# Patient Record
Sex: FEMALE | URBAN - METROPOLITAN AREA
[De-identification: names, ages, dates, MRNs, and addresses within clinical notes are randomized per-mention and may not be internally consistent; named-entity substitution may affect disease eponyms.]

---

## 2020-01-29 ENCOUNTER — APPOINTMENT (RX ONLY)
Dept: URBAN - METROPOLITAN AREA CLINIC 29 | Facility: CLINIC | Age: 55
Setting detail: DERMATOLOGY
End: 2020-01-29

## 2020-01-29 DIAGNOSIS — Z41.9 ENCOUNTER FOR PROCEDURE FOR PURPOSES OTHER THAN REMEDYING HEALTH STATE, UNSPECIFIED: ICD-10-CM

## 2020-01-29 PROCEDURE — ? COSMETIC CONSULTATION - MICRO-NEEDLING

## 2020-01-29 ASSESSMENT — LOCATION ZONE DERM: LOCATION ZONE: FACE

## 2020-01-29 ASSESSMENT — LOCATION SIMPLE DESCRIPTION DERM: LOCATION SIMPLE: LEFT CHEEK

## 2020-01-29 ASSESSMENT — LOCATION DETAILED DESCRIPTION DERM: LOCATION DETAILED: LEFT INFERIOR CENTRAL MALAR CHEEK

## 2020-02-25 ENCOUNTER — APPOINTMENT (RX ONLY)
Dept: URBAN - METROPOLITAN AREA CLINIC 29 | Facility: CLINIC | Age: 55
Setting detail: DERMATOLOGY
End: 2020-02-25

## 2020-02-25 DIAGNOSIS — Z41.9 ENCOUNTER FOR PROCEDURE FOR PURPOSES OTHER THAN REMEDYING HEALTH STATE, UNSPECIFIED: ICD-10-CM

## 2020-02-25 PROCEDURE — ? CHEMICAL PEEL

## 2020-02-25 ASSESSMENT — LOCATION ZONE DERM: LOCATION ZONE: FACE

## 2020-02-25 ASSESSMENT — LOCATION DETAILED DESCRIPTION DERM: LOCATION DETAILED: LEFT INFERIOR CENTRAL MALAR CHEEK

## 2020-02-25 ASSESSMENT — LOCATION SIMPLE DESCRIPTION DERM: LOCATION SIMPLE: LEFT CHEEK

## 2020-02-25 NOTE — PROCEDURE: CHEMICAL PEEL
Chemical Peel: 6% Pure Retinol Peel
Treatment Number: 1
Consent: Prior to the procedure, written consent was obtained and risks were reviewed, including but not limited to: redness, peeling, blistering, pigmentary change, scarring, infection, and pain.
Price (Use Numbers Only, No Special Characters Or $): 405.00
Post-Care Instructions: I reviewed with the patient in detail post-care instructions. Patient should avoid sun exposure and wear sun protection.
Detail Level: Zone
Comments: purchased 3 retinol peels. 1 of 3 done today. gave 10% off package purchase. patient purchased [id number] today\\ncleanse, acetone, retinol soln (1 layer) wash off in 8 hours.. pictures taken
Prep: The treated area was degreased with pre-peel cleanser, and vaseline was applied for protection of mucous membranes.
Post Peel Care: After the procedure, a post-peel cream was applied to the treated areas. Sun protection and post-care instructions were reviewed with the patient.

## 2020-02-25 NOTE — HPI: FACE (AGING FACE)
How Severe Is It?: moderate
Is This A New Presentation, Or A Follow-Up?: Aging Face
Additional History: none of the following: auto immune, diabetes, hepatitis, HIV, HSV

## 2020-09-14 ENCOUNTER — APPOINTMENT (RX ONLY)
Dept: URBAN - METROPOLITAN AREA CLINIC 29 | Facility: CLINIC | Age: 55
Setting detail: DERMATOLOGY
End: 2020-09-14

## 2020-09-14 DIAGNOSIS — Z41.9 ENCOUNTER FOR PROCEDURE FOR PURPOSES OTHER THAN REMEDYING HEALTH STATE, UNSPECIFIED: ICD-10-CM

## 2020-09-14 PROCEDURE — ? CHEMICAL PEEL

## 2020-09-14 ASSESSMENT — LOCATION ZONE DERM: LOCATION ZONE: FACE

## 2020-09-14 ASSESSMENT — LOCATION DETAILED DESCRIPTION DERM: LOCATION DETAILED: LEFT INFERIOR CENTRAL MALAR CHEEK

## 2020-09-14 ASSESSMENT — LOCATION SIMPLE DESCRIPTION DERM: LOCATION SIMPLE: LEFT CHEEK

## 2020-09-14 NOTE — PROCEDURE: CHEMICAL PEEL
Prep: The treated area was degreased with pre-peel cleanser, and vaseline was applied for protection of mucous membranes.
Treatment Number: 2
Post Peel Care: After the procedure, a post-peel cream was applied to the treated areas. Sun protection and post-care instructions were reviewed with the patient.
Consent: Prior to the procedure, written consent was obtained and risks were reviewed, including but not limited to: redness, peeling, blistering, pigmentary change, scarring, infection, and pain.
Chemical Peel: 6% Pure Retinol Peel
Detail Level: Zone
Comments: cleanse, acetone, .7% retinol, .3% arbutase, wash off in 8 hours. recommended new compounding cream to be purchased on next visit
Post-Care Instructions: I reviewed with the patient in detail post-care instructions. Patient should avoid sun exposure and wear sun protection.

## 2020-10-12 ENCOUNTER — APPOINTMENT (RX ONLY)
Dept: URBAN - METROPOLITAN AREA CLINIC 29 | Facility: CLINIC | Age: 55
Setting detail: DERMATOLOGY
End: 2020-10-12

## 2020-10-12 DIAGNOSIS — Z41.9 ENCOUNTER FOR PROCEDURE FOR PURPOSES OTHER THAN REMEDYING HEALTH STATE, UNSPECIFIED: ICD-10-CM

## 2020-10-12 PROCEDURE — ? OTHER

## 2020-10-12 PROCEDURE — ? CHEMICAL PEEL

## 2020-10-12 ASSESSMENT — LOCATION ZONE DERM: LOCATION ZONE: FACE

## 2020-10-12 ASSESSMENT — LOCATION DETAILED DESCRIPTION DERM: LOCATION DETAILED: LEFT INFERIOR CENTRAL MALAR CHEEK

## 2020-10-12 ASSESSMENT — LOCATION SIMPLE DESCRIPTION DERM: LOCATION SIMPLE: LEFT CHEEK

## 2020-10-12 NOTE — PROCEDURE: OTHER
Other (Free Text): patient purchased microneedling HA tx and retinol peel x1 to be done on her next tx in November. Combo tx
Detail Level: Zone
Note Text (......Xxx Chief Complaint.): This diagnosis correlates with the

## 2020-10-12 NOTE — PROCEDURE: CHEMICAL PEEL
Comments: cleanse, acetone, 1% retinol, wash off in 8 hours\\ncompunded new complexion correcting cream with 1% retinol solution... directions to start using tomorrow evening. patient to use her cerave during the day and new compounded cream at night. gave her sample of triple lipid in case she started feeling sensitive...
Detail Level: Zone
Post-Care Instructions: I reviewed with the patient in detail post-care instructions. Patient should avoid sun exposure and wear sun protection.
Post Peel Care: After the procedure, a post-peel cream was applied to the treated areas. Sun protection and post-care instructions were reviewed with the patient.
Chemical Peel: 6% Pure Retinol Peel
Consent: Prior to the procedure, written consent was obtained and risks were reviewed, including but not limited to: redness, peeling, blistering, pigmentary change, scarring, infection, and pain.
Treatment Number: 3
Prep: The treated area was degreased with pre-peel cleanser, and vaseline was applied for protection of mucous membranes.

## 2020-11-11 ENCOUNTER — APPOINTMENT (RX ONLY)
Dept: URBAN - METROPOLITAN AREA CLINIC 29 | Facility: CLINIC | Age: 55
Setting detail: DERMATOLOGY
End: 2020-11-11

## 2020-11-11 DIAGNOSIS — Z41.9 ENCOUNTER FOR PROCEDURE FOR PURPOSES OTHER THAN REMEDYING HEALTH STATE, UNSPECIFIED: ICD-10-CM

## 2020-11-11 PROCEDURE — ? OTHER

## 2020-11-11 PROCEDURE — ? MICRONEEDLING

## 2020-11-11 ASSESSMENT — LOCATION SIMPLE DESCRIPTION DERM: LOCATION SIMPLE: LEFT CHEEK

## 2020-11-11 ASSESSMENT — LOCATION DETAILED DESCRIPTION DERM: LOCATION DETAILED: LEFT INFERIOR CENTRAL MALAR CHEEK

## 2020-11-11 ASSESSMENT — LOCATION ZONE DERM: LOCATION ZONE: FACE

## 2020-11-11 NOTE — PROCEDURE: MICRONEEDLING
Location #4: nose, upper lip
Post-Care Instructions: After the procedure, take precautions agains sun exposure. Do not apply sunscreen for 12 hours after the procedure. Do not apply make-up for 12 hours after the procedure. Avoid alcohol based toners for 10-14 days. After 2-3 days patients can return to their regular skin regimen.
Depth In Mm (Location #2): 1.25
Depth In Mm (Location #1): 1
Infusions (Optional): hyaluronic acid
Depth In Mm (Location #5): 0.5
Location #3: forehead, temples
Topical Anesthesia?: BLT cream (benzocaine 10%, lidocaine 4%, tetracaine 2%)
Detail Level: Zone
Consent: Written consent obtained, risks reviewed including but not limited to pain, scarring, infection and incomplete improvement.  Patient understands the procedure is cosmetic in nature and will require out of pocket payment.
Depth In Mm (Location #3): 0.75
Location #2: neck, chest
Location #1: cheeks, chin

## 2020-11-11 NOTE — PROCEDURE: OTHER
Other (Free Text): 1ml retinol, .5ml arbutase, wash off in 8 hours. gave sample of even tone..
Note Text (......Xxx Chief Complaint.): This diagnosis correlates with the
Detail Level: Zone

## 2020-12-16 ENCOUNTER — APPOINTMENT (RX ONLY)
Dept: URBAN - METROPOLITAN AREA CLINIC 29 | Facility: CLINIC | Age: 55
Setting detail: DERMATOLOGY
End: 2020-12-16

## 2020-12-16 DIAGNOSIS — Z41.9 ENCOUNTER FOR PROCEDURE FOR PURPOSES OTHER THAN REMEDYING HEALTH STATE, UNSPECIFIED: ICD-10-CM

## 2020-12-16 PROCEDURE — ? DERMAPLANE

## 2020-12-16 PROCEDURE — ? OTHER

## 2020-12-16 ASSESSMENT — LOCATION SIMPLE DESCRIPTION DERM: LOCATION SIMPLE: LEFT CHEEK

## 2020-12-16 ASSESSMENT — LOCATION DETAILED DESCRIPTION DERM: LOCATION DETAILED: LEFT INFERIOR CENTRAL MALAR CHEEK

## 2020-12-16 ASSESSMENT — LOCATION ZONE DERM: LOCATION ZONE: FACE

## 2020-12-16 NOTE — PROCEDURE: OTHER
Note Text (......Xxx Chief Complaint.): This diagnosis correlates with the
Detail Level: Zone
Other (Free Text): pictures taken. cleanse, acetone, dermaplane, dermapeel, gentle cleanser, lightening peel (3-5 mins) phyto gel, hydrating b5 gel, ce ferulic, triple lipid and uv daily tinted\\n

## 2020-12-16 NOTE — PROCEDURE: DERMAPLANE
Price (Use Numbers Only, No Special Characters Or $): 99.00
Blade: Sharkey scalpel
Treatment Area Prep: acetone
Post-Care Instructions: I reviewed with the patient in detail post-care instructions.
Detail Level: Zone
Pre-Procedure Text: The patient was placed in a recumbant position on the procedure table.
Treatment Areas: face
Post-Procedure Instructions: Following the dermaplane procedure, Oxymist treatment was applied to the treatment areas. Moisturizer and SPF was applied.

## 2021-03-09 ENCOUNTER — APPOINTMENT (RX ONLY)
Dept: URBAN - METROPOLITAN AREA CLINIC 29 | Facility: CLINIC | Age: 56
Setting detail: DERMATOLOGY
End: 2021-03-09

## 2021-03-09 DIAGNOSIS — Z41.9 ENCOUNTER FOR PROCEDURE FOR PURPOSES OTHER THAN REMEDYING HEALTH STATE, UNSPECIFIED: ICD-10-CM

## 2021-03-09 PROCEDURE — ? OTHER

## 2021-03-09 PROCEDURE — ? MICRONEEDLING

## 2021-03-09 ASSESSMENT — LOCATION SIMPLE DESCRIPTION DERM: LOCATION SIMPLE: LEFT CHEEK

## 2021-03-09 ASSESSMENT — LOCATION DETAILED DESCRIPTION DERM: LOCATION DETAILED: LEFT INFERIOR CENTRAL MALAR CHEEK

## 2021-03-09 ASSESSMENT — LOCATION ZONE DERM: LOCATION ZONE: FACE

## 2021-03-09 NOTE — PROCEDURE: OTHER
Detail Level: Zone
Note Text (......Xxx Chief Complaint.): This diagnosis correlates with the
Other (Free Text): added retinol peel on top of microneedling tx\\n1% retinol, .5% arbutase to face, neck and chest. wash off in 6 hours\\npatient loved her results from last tx
Render Risk Assessment In Note?: no

## 2021-03-09 NOTE — PROCEDURE: MICRONEEDLING
Depth In Mm (Location #2): 0.75
Location #4: eyes
Treatment Number (Optional): 2
Depth In Mm (Location #1): 1.25
Location #3: nose, upper lip
Price (Use Numbers Only, No Special Characters Or $): 399.00
Post-Care Instructions: After the procedure, take precautions agains sun exposure. Do not apply sunscreen for 12 hours after the procedure. Do not apply make-up for 12 hours after the procedure. Avoid alcohol based toners for 10-14 days. After 2-3 days patients can return to their regular skin regimen.
Location #2: forehead, temples
Depth In Mm (Location #4): 0.25
Detail Level: Zone
Infusions (Optional): hyaluronic acid
Depth In Mm (Location #3): 0.5
Depth In Mm (Location #5): 1.75
Consent: Written consent obtained, risks reviewed including but not limited to pain, scarring, infection and incomplete improvement.  Patient understands the procedure is cosmetic in nature and will require out of pocket payment.
Topical Anesthesia?: BLT cream (benzocaine 10%, lidocaine 4%, tetracaine 2%)
Location #1: cheeks, chin, neck, chest

## 2021-04-05 ENCOUNTER — APPOINTMENT (RX ONLY)
Dept: URBAN - METROPOLITAN AREA CLINIC 29 | Facility: CLINIC | Age: 56
Setting detail: DERMATOLOGY
End: 2021-04-05

## 2021-04-05 DIAGNOSIS — Z41.9 ENCOUNTER FOR PROCEDURE FOR PURPOSES OTHER THAN REMEDYING HEALTH STATE, UNSPECIFIED: ICD-10-CM

## 2021-04-05 PROCEDURE — ? FACIAL

## 2021-04-05 ASSESSMENT — LOCATION DETAILED DESCRIPTION DERM: LOCATION DETAILED: LEFT INFERIOR CENTRAL MALAR CHEEK

## 2021-04-05 ASSESSMENT — LOCATION SIMPLE DESCRIPTION DERM: LOCATION SIMPLE: LEFT CHEEK

## 2021-04-05 ASSESSMENT — LOCATION ZONE DERM: LOCATION ZONE: FACE

## 2021-04-05 NOTE — PROCEDURE: FACIAL
Treatment Type Override: clinical facial
Detail Level: Zone
Comments (Sticky): cleanse, dermaplane, dermapeel, lightning peel, phyto gel, CE Ferulic, triple lipid and uv daily tinted
Extraction Method: sterile needle
Mask Type (Optional): gel based

## 2021-04-21 ENCOUNTER — APPOINTMENT (RX ONLY)
Dept: URBAN - METROPOLITAN AREA CLINIC 29 | Facility: CLINIC | Age: 56
Setting detail: DERMATOLOGY
End: 2021-04-21

## 2021-04-21 DIAGNOSIS — L82.1 OTHER SEBORRHEIC KERATOSIS: ICD-10-CM

## 2021-04-21 DIAGNOSIS — L73.8 OTHER SPECIFIED FOLLICULAR DISORDERS: ICD-10-CM

## 2021-04-21 DIAGNOSIS — D22 MELANOCYTIC NEVI: ICD-10-CM

## 2021-04-21 DIAGNOSIS — L82.0 INFLAMED SEBORRHEIC KERATOSIS: ICD-10-CM

## 2021-04-21 DIAGNOSIS — Z71.89 OTHER SPECIFIED COUNSELING: ICD-10-CM

## 2021-04-21 DIAGNOSIS — D18.0 HEMANGIOMA: ICD-10-CM

## 2021-04-21 DIAGNOSIS — L81.1 CHLOASMA: ICD-10-CM | Status: INADEQUATELY CONTROLLED

## 2021-04-21 DIAGNOSIS — L81.4 OTHER MELANIN HYPERPIGMENTATION: ICD-10-CM

## 2021-04-21 DIAGNOSIS — L91.8 OTHER HYPERTROPHIC DISORDERS OF THE SKIN: ICD-10-CM

## 2021-04-21 DIAGNOSIS — L81.0 POSTINFLAMMATORY HYPERPIGMENTATION: ICD-10-CM

## 2021-04-21 PROBLEM — D18.01 HEMANGIOMA OF SKIN AND SUBCUTANEOUS TISSUE: Status: ACTIVE | Noted: 2021-04-21

## 2021-04-21 PROBLEM — D48.5 NEOPLASM OF UNCERTAIN BEHAVIOR OF SKIN: Status: ACTIVE | Noted: 2021-04-21

## 2021-04-21 PROBLEM — D22.5 MELANOCYTIC NEVI OF TRUNK: Status: ACTIVE | Noted: 2021-04-21

## 2021-04-21 PROCEDURE — ? ADDITIONAL NOTES

## 2021-04-21 PROCEDURE — 99204 OFFICE O/P NEW MOD 45 MIN: CPT | Mod: 25

## 2021-04-21 PROCEDURE — ? COUNSELING

## 2021-04-21 PROCEDURE — ? BIOPSY BY SHAVE METHOD

## 2021-04-21 PROCEDURE — ? PRESCRIPTION MEDICATION MANAGEMENT

## 2021-04-21 PROCEDURE — ? PRESCRIPTION

## 2021-04-21 PROCEDURE — 11102 TANGNTL BX SKIN SINGLE LES: CPT

## 2021-04-21 RX ORDER — FLUOCINOLONE ACETONIDE, HYDROQUINONE, AND TRETINOIN .1; 40; .5 MG/G; MG/G; MG/G
CREAM TOPICAL QHS
Qty: 1 | Refills: 2 | Status: ERX | COMMUNITY
Start: 2021-04-21

## 2021-04-21 RX ADMIN — FLUOCINOLONE ACETONIDE, HYDROQUINONE, AND TRETINOIN 1: .1; 40; .5 CREAM TOPICAL at 00:00

## 2021-04-21 ASSESSMENT — LOCATION DETAILED DESCRIPTION DERM
LOCATION DETAILED: RIGHT VENTRAL DISTAL FOREARM
LOCATION DETAILED: RIGHT MID-UPPER BACK
LOCATION DETAILED: LEFT LATERAL BUCCAL CHEEK
LOCATION DETAILED: RIGHT LATERAL BREAST 10-11:00 REGION
LOCATION DETAILED: RIGHT SUPERIOR MEDIAL MIDBACK
LOCATION DETAILED: RIGHT FOREHEAD
LOCATION DETAILED: LEFT CENTRAL MALAR CHEEK
LOCATION DETAILED: LEFT SUPERIOR UPPER BACK
LOCATION DETAILED: INFERIOR THORACIC SPINE
LOCATION DETAILED: LEFT VENTRAL DISTAL FOREARM
LOCATION DETAILED: EPIGASTRIC SKIN
LOCATION DETAILED: LEFT LATERAL SUPERIOR CHEST
LOCATION DETAILED: LEFT INFERIOR UPPER BACK
LOCATION DETAILED: RIGHT SUPERIOR LATERAL NECK
LOCATION DETAILED: LEFT SUPERIOR MEDIAL UPPER BACK
LOCATION DETAILED: RIGHT PROXIMAL DORSAL FOREARM
LOCATION DETAILED: RIGHT INFERIOR CENTRAL MALAR CHEEK
LOCATION DETAILED: LEFT PROXIMAL DORSAL FOREARM

## 2021-04-21 ASSESSMENT — LOCATION ZONE DERM
LOCATION ZONE: ARM
LOCATION ZONE: TRUNK
LOCATION ZONE: NECK
LOCATION ZONE: FACE

## 2021-04-21 ASSESSMENT — LOCATION SIMPLE DESCRIPTION DERM
LOCATION SIMPLE: RIGHT FOREHEAD
LOCATION SIMPLE: ABDOMEN
LOCATION SIMPLE: LEFT UPPER BACK
LOCATION SIMPLE: CHEST
LOCATION SIMPLE: RIGHT BREAST
LOCATION SIMPLE: RIGHT LOWER BACK
LOCATION SIMPLE: RIGHT CHEEK
LOCATION SIMPLE: RIGHT FOREARM
LOCATION SIMPLE: UPPER BACK
LOCATION SIMPLE: LEFT CHEEK
LOCATION SIMPLE: NECK
LOCATION SIMPLE: RIGHT UPPER BACK
LOCATION SIMPLE: LEFT FOREARM

## 2021-04-21 NOTE — PROCEDURE: ADDITIONAL NOTES
Additional Notes: Discussed PIH and scarring \\n-no laser tx for the next 2 weeks\\n\\n-pt verbalized understanding
Detail Level: Detailed
Render Risk Assessment In Note?: no

## 2021-04-21 NOTE — PROCEDURE: COUNSELING
Detail Level: Detailed
Detail Level: Zone
Sunscreen Recommendations: otc broad spectrum sunscreen with minimum spf of 30 or higher

## 2021-04-21 NOTE — PROCEDURE: PRESCRIPTION MEDICATION MANAGEMENT
Render In Strict Bullet Format?: No
Detail Level: Zone
Plan: Informed pt that melasma is the tough to tx \\n\\nDiscussed ochronosis if Tri-janice is overused \\n-strict sunscreen protection \\n\\n Recommended pt use Skinceuticals discoloration defense if pt does not want to use hydroquinone products \\n\\nInformed pt that BBL. Can make melasma worse due to the heat \\n\\nPt verbalized understanding
Initiate Treatment: Tri-Hillary 0.01 %-4 %-0.05 % topical cream QHS\\nDays Supply: 30\\nSig: Apply pea size amount to discoloration on face qhs for 4 months on and two months off; strict sun protection

## 2021-04-21 NOTE — PROCEDURE: BIOPSY BY SHAVE METHOD
Detail Level: Detailed
Depth Of Biopsy: dermis
Was A Bandage Applied: Yes
Size Of Lesion In Cm: 0
Biopsy Type: H and E
Biopsy Method: Personna blade
Anesthesia Type: 1% lidocaine with epinephrine
Anesthesia Volume In Cc: 0.5
Hemostasis: Aluminum Chloride
Wound Care: Vaseline
Dressing: bandage
Destruction After The Procedure: No
Type Of Destruction Used: Curettage
Curettage Text: The wound bed was treated with curettage after the biopsy was performed.
Cryotherapy Text: The wound bed was treated with cryotherapy after the biopsy was performed.
Electrodesiccation Text: The wound bed was treated with electrodesiccation after the biopsy was performed.
Electrodesiccation And Curettage Text: The wound bed was treated with electrodesiccation and curettage after the biopsy was performed.
Silver Nitrate Text: The wound bed was treated with silver nitrate after the biopsy was performed.
Lab: [id number]
Consent: Written consent was obtained and risks were reviewed including but not limited to scarring, infection, bleeding, scabbing, incomplete removal, nerve damage and allergy to anesthesia.
Post-Care Instructions: I reviewed with the patient in detail post-care instructions. Patient is to keep the biopsy site dry overnight, wash daily with mild soap water and then apply Vaseline twice daily until healed.
Notification Instructions: Patient will be notified of biopsy results. However, patient instructed to call the office if not contacted within 2 weeks. Results will be faxed to PCP once they are available.
Billing Type: Third-Party Bill
Information: Selecting Yes will display possible errors in your note based on the variables you have selected. This validation is only offered as a suggestion for you. PLEASE NOTE THAT THE VALIDATION TEXT WILL BE REMOVED WHEN YOU FINALIZE YOUR NOTE. IF YOU WANT TO FAX A PRELIMINARY NOTE YOU WILL NEED TO TOGGLE THIS TO 'NO' IF YOU DO NOT WANT IT IN YOUR FAXED NOTE.

## 2021-04-21 NOTE — HPI: DISCOLORATION (MELASMA)
How Severe Are They?: moderate
Is This A New Presentation, Or A Follow-Up?: Discoloration
Include Thrombotic Event Screening In Note For Tranexamic Acid Therapy?: Yes

## 2021-05-19 ENCOUNTER — APPOINTMENT (RX ONLY)
Dept: URBAN - METROPOLITAN AREA CLINIC 29 | Facility: CLINIC | Age: 56
Setting detail: DERMATOLOGY
End: 2021-05-19

## 2021-05-19 DIAGNOSIS — Z41.9 ENCOUNTER FOR PROCEDURE FOR PURPOSES OTHER THAN REMEDYING HEALTH STATE, UNSPECIFIED: ICD-10-CM

## 2021-05-19 PROCEDURE — ? MICRONEEDLING

## 2021-05-19 PROCEDURE — ? OTHER

## 2021-05-19 ASSESSMENT — LOCATION ZONE DERM: LOCATION ZONE: FACE

## 2021-05-19 ASSESSMENT — LOCATION DETAILED DESCRIPTION DERM: LOCATION DETAILED: LEFT INFERIOR MEDIAL MALAR CHEEK

## 2021-05-19 ASSESSMENT — LOCATION SIMPLE DESCRIPTION DERM: LOCATION SIMPLE: LEFT CHEEK

## 2021-05-19 NOTE — PROCEDURE: OTHER
Detail Level: Zone
Other (Free Text): retinol peel applied over microneedling tx. wash off in 2 hours
Note Text (......Xxx Chief Complaint.): This diagnosis correlates with the
Render Risk Assessment In Note?: no

## 2021-05-19 NOTE — PROCEDURE: MICRONEEDLING
Location #3: nose, upper lip
Detail Level: Zone
Location #4: eyes
Depth In Mm (Location #5): 1.75
Topical Anesthesia?: BLT cream (benzocaine 10%, lidocaine 4%, tetracaine 2%)
Depth In Mm (Location #4): 0.25
Post-Care Instructions: After the procedure, take precautions agains sun exposure. Do not apply sunscreen for 12 hours after the procedure. Do not apply make-up for 12 hours after the procedure. Avoid alcohol based toners for 10-14 days. After 2-3 days patients can return to their regular skin regimen.
Location #2: forehead, temples
Price (Use Numbers Only, No Special Characters Or $): 399.00
Treatment Number (Optional): 3
Location #1: cheeks, chin, neck, chest
Infusions (Optional): hyaluronic acid
Depth In Mm (Location #3): 0.5
Depth In Mm (Location #2): 0.75
Depth In Mm (Location #1): 1.25
Consent: Written consent obtained, risks reviewed including but not limited to pain, scarring, infection and incomplete improvement.  Patient understands the procedure is cosmetic in nature and will require out of pocket payment.

## 2021-06-02 ENCOUNTER — APPOINTMENT (RX ONLY)
Dept: URBAN - METROPOLITAN AREA CLINIC 29 | Facility: CLINIC | Age: 56
Setting detail: DERMATOLOGY
End: 2021-06-02

## 2021-06-02 DIAGNOSIS — L98.8 OTHER SPECIFIED DISORDERS OF THE SKIN AND SUBCUTANEOUS TISSUE: ICD-10-CM

## 2021-06-02 PROCEDURE — ? BOTOX

## 2021-06-02 PROCEDURE — ? PHOTO-DOCUMENTATION

## 2021-06-02 ASSESSMENT — LOCATION DETAILED DESCRIPTION DERM
LOCATION DETAILED: RIGHT INFERIOR LATERAL FOREHEAD
LOCATION DETAILED: LEFT INFERIOR FOREHEAD
LOCATION DETAILED: LEFT SUPERIOR FOREHEAD
LOCATION DETAILED: LEFT INFERIOR MEDIAL FOREHEAD
LOCATION DETAILED: SUPERIOR MID FOREHEAD
LOCATION DETAILED: RIGHT LATERAL FOREHEAD
LOCATION DETAILED: INFERIOR MID FOREHEAD

## 2021-06-02 ASSESSMENT — LOCATION SIMPLE DESCRIPTION DERM
LOCATION SIMPLE: LEFT FOREHEAD
LOCATION SIMPLE: INFERIOR FOREHEAD
LOCATION SIMPLE: SUPERIOR FOREHEAD
LOCATION SIMPLE: RIGHT FOREHEAD

## 2021-06-02 ASSESSMENT — LOCATION ZONE DERM: LOCATION ZONE: FACE

## 2021-06-02 NOTE — PROCEDURE: BOTOX
R Brow Units: 0
Post-Care Instructions: Patient instructed to not lie down for 4 hours and limit physical activity for 24 hours. Patient instructed not to travel by airplane for 48 hours.
Show Additional Area 1: Yes
Show Right And Left Periorbital Units: No
Dilution (U/0.1 Cc): 5
Glabellar Complex Units: 20
Reconstitution Date (Optional): 5/27/2021
Expiration Date (Month Year): 08/2023
Detail Level: Simple
Lot #: [id number]
Price (Use Numbers Only, No Special Characters Or $): 390
Forehead Units: 10
Consent: Written consent obtained. Risks include but not limited to lid/brow ptosis, bruising, swelling, diplopia, temporary effect, incomplete chemical denervation.

## 2021-06-22 ENCOUNTER — APPOINTMENT (RX ONLY)
Dept: URBAN - METROPOLITAN AREA CLINIC 29 | Facility: CLINIC | Age: 56
Setting detail: DERMATOLOGY
End: 2021-06-22

## 2021-06-22 DIAGNOSIS — L98.8 OTHER SPECIFIED DISORDERS OF THE SKIN AND SUBCUTANEOUS TISSUE: ICD-10-CM

## 2021-06-22 PROCEDURE — ? BOTOX

## 2021-06-22 ASSESSMENT — LOCATION ZONE DERM: LOCATION ZONE: FACE

## 2021-06-22 ASSESSMENT — LOCATION DETAILED DESCRIPTION DERM
LOCATION DETAILED: RIGHT INFERIOR LATERAL FOREHEAD
LOCATION DETAILED: RIGHT LATERAL FOREHEAD

## 2021-06-22 ASSESSMENT — LOCATION SIMPLE DESCRIPTION DERM: LOCATION SIMPLE: RIGHT FOREHEAD

## 2021-06-22 NOTE — PROCEDURE: BOTOX
Left Periorbital Skin Units: 0
Show Levator Superior Units: Yes
Lot #: [id number]
Dilution (U/0.1 Cc): 5
Forehead Units: 2
Consent: Written consent obtained. Risks include but not limited to lid/brow ptosis, bruising, swelling, diplopia, temporary effect, incomplete chemical denervation.
Post-Care Instructions: Patient instructed to not lie down for 4 hours and limit physical activity for 24 hours. Patient instructed not to travel by airplane for 48 hours.
Show Right And Left Periorbital Units: No
Reconstitution Date (Optional): 6/8/21
Expiration Date (Month Year): 9/1/23
Detail Level: Detailed
Price (Use Numbers Only, No Special Characters Or $): 26

## 2021-09-15 ENCOUNTER — APPOINTMENT (RX ONLY)
Dept: URBAN - METROPOLITAN AREA CLINIC 29 | Facility: CLINIC | Age: 56
Setting detail: DERMATOLOGY
End: 2021-09-15

## 2021-09-15 DIAGNOSIS — Z41.9 ENCOUNTER FOR PROCEDURE FOR PURPOSES OTHER THAN REMEDYING HEALTH STATE, UNSPECIFIED: ICD-10-CM

## 2021-09-15 PROCEDURE — ? MICRONEEDLING

## 2021-09-15 PROCEDURE — ? OTHER

## 2021-09-15 ASSESSMENT — LOCATION SIMPLE DESCRIPTION DERM: LOCATION SIMPLE: LEFT CHEEK

## 2021-09-15 ASSESSMENT — LOCATION DETAILED DESCRIPTION DERM: LOCATION DETAILED: LEFT INFERIOR CENTRAL MALAR CHEEK

## 2021-09-15 ASSESSMENT — LOCATION ZONE DERM: LOCATION ZONE: FACE

## 2021-09-15 NOTE — PROCEDURE: MICRONEEDLING
Treatment Number (Optional): 0
Consent: Written consent obtained, risks reviewed including but not limited to pain, scarring, infection and incomplete improvement.  Patient understands the procedure is cosmetic in nature and will require out of pocket payment.
Location #4: eyes, upper lip
Depth In Mm (Location #2): 1.25
Post-Care Instructions: After the procedure, take precautions agains sun exposure. Do not apply sunscreen for 12 hours after the procedure. Do not apply make-up for 12 hours after the procedure. Avoid alcohol based toners for 10-14 days. After 2-3 days patients can return to their regular skin regimen.
Detail Level: Zone
Location #2: neck, chest
Depth In Mm (Location #3): 0.5
Topical Anesthesia?: BLT cream (benzocaine 10%, lidocaine 4%, tetracaine 2%)
Location #1: cheeks, chin, upper lip
Depth In Mm (Location #4): 0.25
Depth In Mm (Location #1): 1
Price (Use Numbers Only, No Special Characters Or $): 375.00
Depth In Mm (Location #5): 1.75
Infusions (Optional): hyaluronic acid
Location #3: forehead, temples,nose

## 2021-10-11 ENCOUNTER — APPOINTMENT (RX ONLY)
Dept: URBAN - METROPOLITAN AREA CLINIC 29 | Facility: CLINIC | Age: 56
Setting detail: DERMATOLOGY
End: 2021-10-11

## 2021-10-11 DIAGNOSIS — Z41.9 ENCOUNTER FOR PROCEDURE FOR PURPOSES OTHER THAN REMEDYING HEALTH STATE, UNSPECIFIED: ICD-10-CM

## 2021-10-11 PROCEDURE — ? FACIAL

## 2021-10-11 ASSESSMENT — LOCATION ZONE DERM: LOCATION ZONE: FACE

## 2021-10-11 ASSESSMENT — LOCATION SIMPLE DESCRIPTION DERM: LOCATION SIMPLE: LEFT CHEEK

## 2021-10-11 ASSESSMENT — LOCATION DETAILED DESCRIPTION DERM: LOCATION DETAILED: LEFT CENTRAL MALAR CHEEK

## 2021-10-11 NOTE — PROCEDURE: FACIAL
Comments (Sticky): cleanse, acetone, dermaplane, dermapeel, gentle cleanse, lighting peel, phyto mask and Vit C mask, CE ferulic, triple lipid and uv elements\\n\\n\\n\\n\\n\\n\\n\\n\\n\\n\\n
Mask Type (Optional): gel based
Extraction Method: sterile needle
Detail Level: Zone
Price (Use Numbers Only, No Special Characters Or $): 99.00

## 2021-12-15 ENCOUNTER — APPOINTMENT (RX ONLY)
Dept: URBAN - METROPOLITAN AREA CLINIC 29 | Facility: CLINIC | Age: 56
Setting detail: DERMATOLOGY
End: 2021-12-15

## 2021-12-15 DIAGNOSIS — L98.8 OTHER SPECIFIED DISORDERS OF THE SKIN AND SUBCUTANEOUS TISSUE: ICD-10-CM

## 2021-12-15 PROCEDURE — ? BOTOX

## 2021-12-15 PROCEDURE — ? PHOTO-DOCUMENTATION

## 2021-12-15 ASSESSMENT — LOCATION ZONE DERM: LOCATION ZONE: FACE

## 2021-12-15 ASSESSMENT — LOCATION SIMPLE DESCRIPTION DERM
LOCATION SIMPLE: GLABELLA
LOCATION SIMPLE: LEFT FOREHEAD
LOCATION SIMPLE: RIGHT FOREHEAD

## 2021-12-15 ASSESSMENT — LOCATION DETAILED DESCRIPTION DERM
LOCATION DETAILED: LEFT MEDIAL FOREHEAD
LOCATION DETAILED: GLABELLA
LOCATION DETAILED: RIGHT INFERIOR FOREHEAD
LOCATION DETAILED: RIGHT FOREHEAD
LOCATION DETAILED: LEFT INFERIOR FOREHEAD
LOCATION DETAILED: RIGHT INFERIOR MEDIAL FOREHEAD
LOCATION DETAILED: LEFT INFERIOR MEDIAL FOREHEAD
LOCATION DETAILED: LEFT FOREHEAD
LOCATION DETAILED: LEFT LATERAL FOREHEAD

## 2021-12-15 NOTE — PROCEDURE: BOTOX
Nasal Root Units: 0
Forehead Units: 10
Show Mentalis Units: No
Expiration Date (Month Year): 02/02/2024
Show Additional Area 2: Yes
Dilution (U/0.1 Cc): 5
Reconstitution Date (Optional): 12/13/2021
Price (Use Numbers Only, No Special Characters Or $): 390
Glabellar Complex Units: 20
Consent: Written consent obtained. Risks include but not limited to lid/brow ptosis, bruising, swelling, diplopia, temporary effect, incomplete chemical denervation.
Post-Care Instructions: Patient instructed to not lie down for 4 hours and limit physical activity for 24 hours. Patient instructed not to travel by airplane for 48 hours.
Detail Level: Detailed
Lot #: [id number]

## 2022-01-01 NOTE — PROCEDURE: OTHER
Continue hypoglycemia protocol.
Detail Level: Zone
Other (Free Text): retinol peel post microneedling tx. wash off in 2 hours, patient purchased complexion correction compounded
Note Text (......Xxx Chief Complaint.): This diagnosis correlates with the
Render Risk Assessment In Note?: no

## 2022-04-20 ENCOUNTER — APPOINTMENT (RX ONLY)
Dept: URBAN - METROPOLITAN AREA CLINIC 29 | Facility: CLINIC | Age: 57
Setting detail: DERMATOLOGY
End: 2022-04-20

## 2022-04-20 DIAGNOSIS — L98.8 OTHER SPECIFIED DISORDERS OF THE SKIN AND SUBCUTANEOUS TISSUE: ICD-10-CM

## 2022-04-20 PROCEDURE — ? BOTOX

## 2022-04-20 PROCEDURE — ? ADDITIONAL NOTES

## 2022-04-20 ASSESSMENT — LOCATION SIMPLE DESCRIPTION DERM
LOCATION SIMPLE: GLABELLA
LOCATION SIMPLE: RIGHT FOREHEAD
LOCATION SIMPLE: LEFT FOREHEAD
LOCATION SIMPLE: SUPERIOR FOREHEAD
LOCATION SIMPLE: LEFT EYEBROW
LOCATION SIMPLE: RIGHT EYEBROW

## 2022-04-20 ASSESSMENT — LOCATION DETAILED DESCRIPTION DERM
LOCATION DETAILED: RIGHT MEDIAL EYEBROW
LOCATION DETAILED: RIGHT INFERIOR MEDIAL FOREHEAD
LOCATION DETAILED: SUPERIOR MID FOREHEAD
LOCATION DETAILED: LEFT MEDIAL EYEBROW
LOCATION DETAILED: GLABELLA
LOCATION DETAILED: RIGHT LATERAL FOREHEAD
LOCATION DETAILED: LEFT INFERIOR MEDIAL FOREHEAD
LOCATION DETAILED: RIGHT SUPERIOR FOREHEAD
LOCATION DETAILED: LEFT LATERAL FOREHEAD
LOCATION DETAILED: LEFT SUPERIOR FOREHEAD

## 2022-04-20 ASSESSMENT — LOCATION ZONE DERM: LOCATION ZONE: FACE

## 2022-04-20 NOTE — PROCEDURE: BOTOX
Lcl Root Units: 0
Show Anterior Platysmal Band Units: Yes
Show Ucl Units: No
Glabellar Complex Units: 20
Consent: Written consent obtained. Risks include but not limited to lid/brow ptosis, bruising, swelling, diplopia, temporary effect, incomplete chemical denervation.
Detail Level: Detailed
Post-Care Instructions: Patient instructed to not lie down for 4 hours and limit physical activity for 24 hours. Patient instructed not to travel by airplane for 48 hours.
Lot #: [id number]
Expiration Date (Month Year): 5/31/24
Price (Use Numbers Only, No Special Characters Or $): 282
Forehead Units: 10
Dilution (U/0.1 Cc): 5

## 2022-06-28 ENCOUNTER — APPOINTMENT (RX ONLY)
Dept: URBAN - METROPOLITAN AREA CLINIC 29 | Facility: CLINIC | Age: 57
Setting detail: DERMATOLOGY
End: 2022-06-28

## 2022-06-28 DIAGNOSIS — D22 MELANOCYTIC NEVI: ICD-10-CM

## 2022-06-28 DIAGNOSIS — L73.8 OTHER SPECIFIED FOLLICULAR DISORDERS: ICD-10-CM

## 2022-06-28 DIAGNOSIS — L82.1 OTHER SEBORRHEIC KERATOSIS: ICD-10-CM

## 2022-06-28 DIAGNOSIS — L81.1 CHLOASMA: ICD-10-CM | Status: STABLE

## 2022-06-28 DIAGNOSIS — L64.8 OTHER ANDROGENIC ALOPECIA: ICD-10-CM | Status: INADEQUATELY CONTROLLED

## 2022-06-28 DIAGNOSIS — Z71.89 OTHER SPECIFIED COUNSELING: ICD-10-CM

## 2022-06-28 DIAGNOSIS — L91.8 OTHER HYPERTROPHIC DISORDERS OF THE SKIN: ICD-10-CM

## 2022-06-28 DIAGNOSIS — D18.0 HEMANGIOMA: ICD-10-CM

## 2022-06-28 PROBLEM — D18.01 HEMANGIOMA OF SKIN AND SUBCUTANEOUS TISSUE: Status: ACTIVE | Noted: 2022-06-28

## 2022-06-28 PROBLEM — D22.5 MELANOCYTIC NEVI OF TRUNK: Status: ACTIVE | Noted: 2022-06-28

## 2022-06-28 PROCEDURE — ? ADDITIONAL NOTES

## 2022-06-28 PROCEDURE — 99214 OFFICE O/P EST MOD 30 MIN: CPT

## 2022-06-28 PROCEDURE — ? PRESCRIPTION

## 2022-06-28 PROCEDURE — ? BENIGN DESTRUCTION COSMETIC

## 2022-06-28 PROCEDURE — ? SKIN TAG REMOVAL (COSMETIC)

## 2022-06-28 PROCEDURE — ? PRESCRIPTION MEDICATION MANAGEMENT

## 2022-06-28 PROCEDURE — ? COUNSELING

## 2022-06-28 RX ORDER — FLUOCINOLONE ACETONIDE, HYDROQUINONE, AND TRETINOIN .1; 40; .5 MG/G; MG/G; MG/G
CREAM TOPICAL QHS
Qty: 30 | Refills: 3 | Status: ERX | COMMUNITY
Start: 2022-06-28

## 2022-06-28 RX ADMIN — FLUOCINOLONE ACETONIDE, HYDROQUINONE, AND TRETINOIN: .1; 40; .5 CREAM TOPICAL at 00:00

## 2022-06-28 ASSESSMENT — LOCATION ZONE DERM
LOCATION ZONE: FACE
LOCATION ZONE: SCALP
LOCATION ZONE: ARM
LOCATION ZONE: TRUNK

## 2022-06-28 ASSESSMENT — LOCATION DETAILED DESCRIPTION DERM
LOCATION DETAILED: LEFT INFERIOR UPPER BACK
LOCATION DETAILED: INFERIOR THORACIC SPINE
LOCATION DETAILED: LEFT VENTRAL DISTAL FOREARM
LOCATION DETAILED: RIGHT FOREHEAD
LOCATION DETAILED: RIGHT INFERIOR CENTRAL MALAR CHEEK
LOCATION DETAILED: RIGHT MID-UPPER BACK
LOCATION DETAILED: LEFT PROXIMAL DORSAL FOREARM
LOCATION DETAILED: LEFT CENTRAL MALAR CHEEK
LOCATION DETAILED: RIGHT VENTRAL DISTAL FOREARM
LOCATION DETAILED: LEFT SUPERIOR MEDIAL UPPER BACK
LOCATION DETAILED: RIGHT MEDIAL FRONTAL SCALP
LOCATION DETAILED: RIGHT RIB CAGE
LOCATION DETAILED: LEFT LATERAL SUPERIOR CHEST
LOCATION DETAILED: RIGHT PROXIMAL DORSAL FOREARM
LOCATION DETAILED: LEFT SUPERIOR UPPER BACK
LOCATION DETAILED: EPIGASTRIC SKIN

## 2022-06-28 ASSESSMENT — LOCATION SIMPLE DESCRIPTION DERM
LOCATION SIMPLE: RIGHT SCALP
LOCATION SIMPLE: RIGHT CHEEK
LOCATION SIMPLE: CHEST
LOCATION SIMPLE: ABDOMEN
LOCATION SIMPLE: RIGHT UPPER BACK
LOCATION SIMPLE: LEFT UPPER BACK
LOCATION SIMPLE: RIGHT FOREARM
LOCATION SIMPLE: LEFT CHEEK
LOCATION SIMPLE: LEFT FOREARM
LOCATION SIMPLE: UPPER BACK
LOCATION SIMPLE: RIGHT FOREHEAD

## 2022-06-28 NOTE — PROCEDURE: BENIGN DESTRUCTION COSMETIC
Detail Level: Detailed
Post-Care Instructions: I reviewed with the patient in detail post-care instructions. Patient is to wear sunprotection, and avoid picking at any of the treated lesions. Pt may apply Vaseline to crusted or scabbing areas.
Price (Use Numbers Only, No Special Characters Or $): 50
Consent: The patient's consent was obtained including but not limited to risks of crusting, scabbing, blistering, scarring, darker or lighter pigmentary change, recurrence, incomplete removal and infection.
Anesthesia Type: 1% lidocaine with epinephrine
Anesthesia Volume In Cc: 0.5

## 2022-06-28 NOTE — PROCEDURE: SKIN TAG REMOVAL (COSMETIC)
Price (Use Numbers Only, No Special Characters Or $): 125
Consent: Written consent obtained and the risks of skin tag removal was reviewed with the patient including but not limited to bleeding, pigmentary change, infection, pain, and remote possibility of scarring.
Removed With: scissors
Detail Level: Detailed

## 2022-06-28 NOTE — PROCEDURE: PRESCRIPTION MEDICATION MANAGEMENT
Plan: Recommended Isdin sunscreen
Render In Strict Bullet Format?: No
Detail Level: Zone
Continue Regimen: Tri-Hillary 0.01 %-4 %-0.05 % topical cream QHS\\nQuantity: 30.0 g  Days Supply: 30\\nSig: Apply to discoloration on face nightly for up to 4 months after cleansing, then stop for 2 months. Repeat regimen as needed. Use strict sun protection.

## 2022-06-28 NOTE — PROCEDURE: ADDITIONAL NOTES
Additional Notes: Discussed PRP injections treatment
Detail Level: Detailed
Render Risk Assessment In Note?: no

## 2022-08-03 ENCOUNTER — APPOINTMENT (RX ONLY)
Dept: URBAN - METROPOLITAN AREA CLINIC 29 | Facility: CLINIC | Age: 57
Setting detail: DERMATOLOGY
End: 2022-08-03

## 2022-08-03 DIAGNOSIS — L98.8 OTHER SPECIFIED DISORDERS OF THE SKIN AND SUBCUTANEOUS TISSUE: ICD-10-CM

## 2022-08-03 PROCEDURE — ? BOTOX

## 2022-08-03 PROCEDURE — ? COSMETIC CONSULTATION: BOTOX

## 2022-08-03 ASSESSMENT — LOCATION DETAILED DESCRIPTION DERM
LOCATION DETAILED: RIGHT LATERAL EYEBROW
LOCATION DETAILED: LEFT LATERAL EYEBROW
LOCATION DETAILED: GLABELLA

## 2022-08-03 ASSESSMENT — LOCATION ZONE DERM: LOCATION ZONE: FACE

## 2022-08-03 ASSESSMENT — LOCATION SIMPLE DESCRIPTION DERM
LOCATION SIMPLE: LEFT EYEBROW
LOCATION SIMPLE: GLABELLA
LOCATION SIMPLE: RIGHT EYEBROW

## 2022-08-03 NOTE — PROCEDURE: BOTOX
Show Orbicularis Oculi Units: Yes
[facility] Units: 0
Show Right And Left Pupillary Line Units: No
Comments: Barcode #[id number]
Forehead Units: 10
Glabellar Complex Units: 20
Additional Area 1 Location: brow lift
Expiration Date (Month Year): 05/2024
Price (Use Numbers Only, No Special Characters Or $): 441
Additional Area 1 Units: 4
Detail Level: Detailed
Consent: Written consent obtained. Risks include but not limited to lid/brow ptosis, bruising, swelling, diplopia, temporary effect, incomplete chemical denervation.
Post-Care Instructions: Patient instructed to not lie down for 4 hours and limit physical activity for 24 hours. Patient instructed not to travel by airplane for 48 hours.
Lot #: [id number]
Dilution (U/0.1 Cc): 5
Reconstitution Date (Optional): 08/03/22

## 2022-11-23 ENCOUNTER — APPOINTMENT (RX ONLY)
Dept: URBAN - METROPOLITAN AREA CLINIC 29 | Facility: CLINIC | Age: 57
Setting detail: DERMATOLOGY
End: 2022-11-23

## 2022-11-23 DIAGNOSIS — L98.8 OTHER SPECIFIED DISORDERS OF THE SKIN AND SUBCUTANEOUS TISSUE: ICD-10-CM

## 2022-11-23 PROCEDURE — ? BOTOX

## 2022-11-23 PROCEDURE — ? ADDITIONAL NOTES

## 2022-11-23 ASSESSMENT — LOCATION DETAILED DESCRIPTION DERM
LOCATION DETAILED: LEFT MEDIAL EYEBROW
LOCATION DETAILED: RIGHT SUPERIOR FOREHEAD
LOCATION DETAILED: RIGHT MEDIAL EYEBROW
LOCATION DETAILED: RIGHT SUPERIOR MEDIAL FOREHEAD
LOCATION DETAILED: GLABELLA
LOCATION DETAILED: LEFT SUPERIOR FOREHEAD
LOCATION DETAILED: LEFT SUPERIOR LATERAL FOREHEAD
LOCATION DETAILED: RIGHT LATERAL FOREHEAD

## 2022-11-23 ASSESSMENT — LOCATION SIMPLE DESCRIPTION DERM
LOCATION SIMPLE: GLABELLA
LOCATION SIMPLE: LEFT EYEBROW
LOCATION SIMPLE: RIGHT EYEBROW
LOCATION SIMPLE: RIGHT FOREHEAD
LOCATION SIMPLE: LEFT FOREHEAD

## 2022-11-23 ASSESSMENT — LOCATION ZONE DERM: LOCATION ZONE: FACE

## 2022-11-23 NOTE — PROCEDURE: BOTOX
Show Additional Area 2: Yes
Depressor Anguli Oris Units: 0
Lot #: [id number]
Show Lcl Units: No
Forehead Units: 10
Dilution (U/0.1 Cc): 5
Detail Level: Detailed
Expiration Date (Month Year): 8/31/24
Price (Use Numbers Only, No Special Characters Or $): 390
Glabellar Complex Units: 20
Consent: Written consent obtained. Risks include but not limited to lid/brow ptosis, bruising, swelling, diplopia, temporary effect, incomplete chemical denervation.
Post-Care Instructions: Patient instructed to not lie down for 4 hours and limit physical activity for 24 hours. Patient instructed not to travel by airplane for 48 hours.

## 2023-03-01 ENCOUNTER — APPOINTMENT (RX ONLY)
Dept: URBAN - METROPOLITAN AREA CLINIC 29 | Facility: CLINIC | Age: 58
Setting detail: DERMATOLOGY
End: 2023-03-01

## 2023-03-01 DIAGNOSIS — L98.8 OTHER SPECIFIED DISORDERS OF THE SKIN AND SUBCUTANEOUS TISSUE: ICD-10-CM

## 2023-03-01 PROCEDURE — ? BOTOX (U OR CC)

## 2023-03-01 ASSESSMENT — LOCATION DETAILED DESCRIPTION DERM
LOCATION DETAILED: GLABELLA
LOCATION DETAILED: RIGHT SUPERIOR FOREHEAD
LOCATION DETAILED: LEFT SUPERIOR LATERAL FOREHEAD
LOCATION DETAILED: LEFT SUPERIOR FOREHEAD
LOCATION DETAILED: RIGHT INFERIOR MEDIAL FOREHEAD
LOCATION DETAILED: LEFT MEDIAL EYEBROW
LOCATION DETAILED: LEFT SUPERIOR MEDIAL FOREHEAD
LOCATION DETAILED: LEFT INFERIOR MEDIAL FOREHEAD

## 2023-03-01 ASSESSMENT — LOCATION SIMPLE DESCRIPTION DERM
LOCATION SIMPLE: LEFT EYEBROW
LOCATION SIMPLE: RIGHT FOREHEAD
LOCATION SIMPLE: LEFT FOREHEAD
LOCATION SIMPLE: GLABELLA

## 2023-03-01 ASSESSMENT — LOCATION ZONE DERM: LOCATION ZONE: FACE

## 2023-03-01 NOTE — PROCEDURE: BOTOX (U OR CC)
Post-Care Instructions: Patient instructed to not lie down for 4 hours and limit physical activity for 24 hours.
Periorbital Skin Units/Cc: 0
Reconstitution Date (Optional): 03/01/2023; barcode [id number]
Price (Use Numbers Only, No Special Characters Or $): 182
Lot #: [id number]
Including Pricing Information In The Note: No
Glabellar Complex Units: 3
Expiration Date (Month Year): 04/30/2025
Consent: Written consent obtained. Risks include but not limited to lid/brow ptosis, bruising, swelling, diplopia, temporary effect, incomplete chemical denervation.
Document As Units Or Cc?: units
Forehead Units/Cc: 10
Dilution (U/0.1 Cc): 5
Detail Level: Detailed
Quantity Per Injection Site (Units Or Cc): 2 U
Quantity Per Injection Site (Units Or Cc): 3 U
Expiration Date (Month Year): 02/28/2025
Reconstitution Date (Optional): 2/24/2023; barcode [id number]
Price (Use Numbers Only, No Special Characters Or $): 873
Lot #: [id number]
Glabellar Complex Units: 17
Quantity Per Injection Site (Units Or Cc): 5 U

## 2023-06-14 ENCOUNTER — APPOINTMENT (RX ONLY)
Dept: URBAN - METROPOLITAN AREA CLINIC 29 | Facility: CLINIC | Age: 58
Setting detail: DERMATOLOGY
End: 2023-06-14

## 2023-06-14 DIAGNOSIS — L98.8 OTHER SPECIFIED DISORDERS OF THE SKIN AND SUBCUTANEOUS TISSUE: ICD-10-CM

## 2023-06-14 PROCEDURE — ? BOTOX (U OR CC)

## 2023-06-14 PROCEDURE — ? MEDICAL CONSULTATION: DYNAMIC RHYTIDES

## 2023-06-14 ASSESSMENT — LOCATION DETAILED DESCRIPTION DERM
LOCATION DETAILED: INFERIOR MID FOREHEAD
LOCATION DETAILED: LEFT SUPERIOR MEDIAL FOREHEAD
LOCATION DETAILED: RIGHT INFERIOR MEDIAL FOREHEAD
LOCATION DETAILED: RIGHT SUPERIOR FOREHEAD
LOCATION DETAILED: LEFT SUPERIOR FOREHEAD
LOCATION DETAILED: LEFT SUPERIOR LATERAL FOREHEAD
LOCATION DETAILED: LEFT INFERIOR MEDIAL FOREHEAD
LOCATION DETAILED: GLABELLA

## 2023-06-14 ASSESSMENT — LOCATION SIMPLE DESCRIPTION DERM
LOCATION SIMPLE: INFERIOR FOREHEAD
LOCATION SIMPLE: LEFT FOREHEAD
LOCATION SIMPLE: RIGHT FOREHEAD
LOCATION SIMPLE: GLABELLA

## 2023-06-14 ASSESSMENT — LOCATION ZONE DERM: LOCATION ZONE: FACE

## 2023-06-14 NOTE — PROCEDURE: BOTOX (U OR CC)
Post-Care Instructions: Patient instructed to not lie down for 4 hours and limit physical activity for 24 hours.
Periorbital Skin Units/Cc: 0
Reconstitution Date (Optional): 6/9/2023; Barcode [id number]
Price (Use Numbers Only, No Special Characters Or $): 420
Lot #: [id number]
Including Pricing Information In The Note: Yes
Glabellar Complex Units: 20
Expiration Date (Month Year): 05/31/2025
Consent: Written consent obtained. Risks include but not limited to lid/brow ptosis, bruising, swelling, diplopia, temporary effect, incomplete chemical denervation.
Document As Units Or Cc?: units
Forehead Units/Cc: 10
Dilution (U/0.1 Cc): 5
Detail Level: Detailed
Quantity Per Injection Site (Units Or Cc): 2 U
Quantity Per Injection Site (Units Or Cc): 5 U

## 2023-09-13 ENCOUNTER — APPOINTMENT (RX ONLY)
Dept: URBAN - METROPOLITAN AREA CLINIC 29 | Facility: CLINIC | Age: 58
Setting detail: DERMATOLOGY
End: 2023-09-13

## 2023-09-13 DIAGNOSIS — L98.8 OTHER SPECIFIED DISORDERS OF THE SKIN AND SUBCUTANEOUS TISSUE: ICD-10-CM

## 2023-09-13 PROCEDURE — ? BOTOX

## 2023-09-13 ASSESSMENT — LOCATION SIMPLE DESCRIPTION DERM
LOCATION SIMPLE: LEFT FOREHEAD
LOCATION SIMPLE: RIGHT FOREHEAD
LOCATION SIMPLE: RIGHT EYEBROW
LOCATION SIMPLE: RIGHT SCALP
LOCATION SIMPLE: GLABELLA
LOCATION SIMPLE: LEFT SCALP
LOCATION SIMPLE: LEFT EYEBROW
LOCATION SIMPLE: FRONTAL SCALP

## 2023-09-13 ASSESSMENT — LOCATION DETAILED DESCRIPTION DERM
LOCATION DETAILED: RIGHT MEDIAL EYEBROW
LOCATION DETAILED: LEFT CENTRAL FRONTAL SCALP
LOCATION DETAILED: LEFT SUPERIOR LATERAL FOREHEAD
LOCATION DETAILED: LEFT SUPERIOR FOREHEAD
LOCATION DETAILED: LEFT INFERIOR MEDIAL FOREHEAD
LOCATION DETAILED: RIGHT MEDIAL FRONTAL SCALP
LOCATION DETAILED: GLABELLA
LOCATION DETAILED: RIGHT CENTRAL FRONTAL SCALP
LOCATION DETAILED: MEDIAL FRONTAL SCALP
LOCATION DETAILED: LEFT MEDIAL FRONTAL SCALP
LOCATION DETAILED: LEFT MEDIAL EYEBROW
LOCATION DETAILED: RIGHT SUPERIOR FOREHEAD
LOCATION DETAILED: RIGHT INFERIOR MEDIAL FOREHEAD

## 2023-09-13 ASSESSMENT — LOCATION ZONE DERM
LOCATION ZONE: FACE
LOCATION ZONE: SCALP

## 2023-09-13 NOTE — PROCEDURE: BOTOX
Mentalis Units: 0
Show Ucl Units: No
Consent: Written consent obtained. Risks include but not limited to lid/brow ptosis, bruising, swelling, diplopia, temporary effect, incomplete chemical denervation.
Detail Level: Detailed
Show Additional Area 6: Yes
Post-Care Instructions: Patient instructed to not lie down for 4 hours and limit physical activity for 24 hours. Patient instructed not to travel by airplane for 48 hours.
Forehead Units: 10
Lot #: [id number]
Dilution (U/0.1 Cc): 5
Glabellar Complex Units: 20
Patient Specific Comments (Will Not Stick From Patient To Patient): Barcode: [id number]
Expiration Date (Month Year): 4/30/25
Price (Use Numbers Only, No Special Characters Or $): 420
Incrementing Botox Units: By 0.5 Units

## 2023-10-02 ENCOUNTER — APPOINTMENT (RX ONLY)
Dept: URBAN - METROPOLITAN AREA CLINIC 29 | Facility: CLINIC | Age: 58
Setting detail: DERMATOLOGY
End: 2023-10-02

## 2023-10-02 DIAGNOSIS — L81.4 OTHER MELANIN HYPERPIGMENTATION: ICD-10-CM

## 2023-10-02 DIAGNOSIS — D18.0 HEMANGIOMA: ICD-10-CM

## 2023-10-02 DIAGNOSIS — D22 MELANOCYTIC NEVI: ICD-10-CM

## 2023-10-02 DIAGNOSIS — L90.5 SCAR CONDITIONS AND FIBROSIS OF SKIN: ICD-10-CM

## 2023-10-02 DIAGNOSIS — L82.1 OTHER SEBORRHEIC KERATOSIS: ICD-10-CM

## 2023-10-02 DIAGNOSIS — L81.1 CHLOASMA: ICD-10-CM | Status: STABLE

## 2023-10-02 DIAGNOSIS — Z71.89 OTHER SPECIFIED COUNSELING: ICD-10-CM

## 2023-10-02 DIAGNOSIS — F42.4 EXCORIATION (SKIN-PICKING) DISORDER: ICD-10-CM

## 2023-10-02 PROBLEM — D18.01 HEMANGIOMA OF SKIN AND SUBCUTANEOUS TISSUE: Status: ACTIVE | Noted: 2023-10-02

## 2023-10-02 PROBLEM — D22.5 MELANOCYTIC NEVI OF TRUNK: Status: ACTIVE | Noted: 2023-10-02

## 2023-10-02 PROCEDURE — ? PRESCRIPTION

## 2023-10-02 PROCEDURE — 99213 OFFICE O/P EST LOW 20 MIN: CPT

## 2023-10-02 PROCEDURE — ? SUNSCREEN RECOMMENDATIONS

## 2023-10-02 PROCEDURE — ? PRESCRIPTION MEDICATION MANAGEMENT

## 2023-10-02 PROCEDURE — ? ADDITIONAL NOTES

## 2023-10-02 PROCEDURE — ? COUNSELING

## 2023-10-02 RX ORDER — FLUOCINOLONE ACETONIDE, HYDROQUINONE, AND TRETINOIN .1; 40; .5 MG/G; MG/G; MG/G
1 CREAM TOPICAL QHS
Qty: 30 | Refills: 3 | Status: ERX | COMMUNITY
Start: 2023-10-02

## 2023-10-02 RX ADMIN — FLUOCINOLONE ACETONIDE, HYDROQUINONE, AND TRETINOIN 1: .1; 40; .5 CREAM TOPICAL at 00:00

## 2023-10-02 ASSESSMENT — LOCATION DETAILED DESCRIPTION DERM
LOCATION DETAILED: SUPERIOR MID FOREHEAD
LOCATION DETAILED: LEFT PROXIMAL CALF
LOCATION DETAILED: RIGHT MEDIAL TRAPEZIAL NECK
LOCATION DETAILED: RIGHT DISTAL POSTERIOR THIGH
LOCATION DETAILED: RIGHT ANTERIOR DISTAL THIGH
LOCATION DETAILED: RIGHT VENTRAL DISTAL FOREARM
LOCATION DETAILED: LEFT INFERIOR UPPER BACK
LOCATION DETAILED: LEFT POSTERIOR SHOULDER
LOCATION DETAILED: LEFT DISTAL DORSAL FOREARM
LOCATION DETAILED: LEFT ANTERIOR DISTAL THIGH
LOCATION DETAILED: RIGHT DISTAL CALF
LOCATION DETAILED: RIGHT DISTAL DORSAL FOREARM
LOCATION DETAILED: RIGHT INFERIOR CENTRAL MALAR CHEEK
LOCATION DETAILED: RIGHT POSTERIOR SHOULDER
LOCATION DETAILED: LEFT DISTAL POSTERIOR THIGH
LOCATION DETAILED: LEFT ANTERIOR SHOULDER
LOCATION DETAILED: LEFT MEDIAL BREAST 11-12:00 REGION
LOCATION DETAILED: LEFT PROXIMAL POSTERIOR UPPER ARM
LOCATION DETAILED: LEFT CENTRAL MALAR CHEEK
LOCATION DETAILED: INFERIOR THORACIC SPINE
LOCATION DETAILED: RIGHT INFERIOR LATERAL MALAR CHEEK
LOCATION DETAILED: LEFT SUPERIOR UPPER BACK
LOCATION DETAILED: LEFT SUPERIOR CENTRAL BUCCAL CHEEK
LOCATION DETAILED: RIGHT ANTERIOR LATERAL PROXIMAL THIGH
LOCATION DETAILED: RIGHT PROXIMAL DORSAL FOREARM
LOCATION DETAILED: RIGHT ANTERIOR DISTAL UPPER ARM
LOCATION DETAILED: RIGHT ANTERIOR SHOULDER
LOCATION DETAILED: RIGHT RIB CAGE
LOCATION DETAILED: LEFT DISTAL PRETIBIAL REGION
LOCATION DETAILED: LEFT SUPERIOR MEDIAL UPPER BACK
LOCATION DETAILED: EPIGASTRIC SKIN
LOCATION DETAILED: RIGHT PROXIMAL PRETIBIAL REGION
LOCATION DETAILED: LEFT PROXIMAL DORSAL FOREARM
LOCATION DETAILED: LEFT VENTRAL DISTAL FOREARM

## 2023-10-02 ASSESSMENT — LOCATION SIMPLE DESCRIPTION DERM
LOCATION SIMPLE: LEFT CALF
LOCATION SIMPLE: ABDOMEN
LOCATION SIMPLE: RIGHT UPPER ARM
LOCATION SIMPLE: LEFT CHEEK
LOCATION SIMPLE: LEFT PRETIBIAL REGION
LOCATION SIMPLE: RIGHT SHOULDER
LOCATION SIMPLE: RIGHT CALF
LOCATION SIMPLE: UPPER BACK
LOCATION SIMPLE: LEFT BREAST
LOCATION SIMPLE: RIGHT POSTERIOR THIGH
LOCATION SIMPLE: RIGHT FOREARM
LOCATION SIMPLE: POSTERIOR NECK
LOCATION SIMPLE: RIGHT THIGH
LOCATION SIMPLE: LEFT FOREARM
LOCATION SIMPLE: RIGHT PRETIBIAL REGION
LOCATION SIMPLE: LEFT POSTERIOR UPPER ARM
LOCATION SIMPLE: LEFT SHOULDER
LOCATION SIMPLE: RIGHT CHEEK
LOCATION SIMPLE: LEFT POSTERIOR THIGH
LOCATION SIMPLE: LEFT UPPER BACK
LOCATION SIMPLE: LEFT THIGH
LOCATION SIMPLE: SUPERIOR FOREHEAD

## 2023-10-02 ASSESSMENT — LOCATION ZONE DERM
LOCATION ZONE: NECK
LOCATION ZONE: ARM
LOCATION ZONE: TRUNK
LOCATION ZONE: LEG
LOCATION ZONE: FACE

## 2023-10-02 NOTE — PROCEDURE: COUNSELING
Detail Level: Detailed
Detail Level: Zone
Detail Level: Generalized
Sunscreen Recommendations: otc broad spectrum sunscreen with minimum spf of 30 or higher

## 2023-10-02 NOTE — PROCEDURE: ADDITIONAL NOTES
Additional Notes: Advised patient to RTC if lesion recurs\\nRecommend applying Aquaphor BID until healed. \\n\\nRecommended patient to refrain from picking at lesion.
Render Risk Assessment In Note?: no
Detail Level: Detailed
Additional Notes: Recommended follow up in 6 months when scar has had time to heal complete \\npost dog bite\\n\\nDiscussed saline injections for atrophy\\n-may help improve atrophy but no guarantees can be given\\n\\nPatient verbalized understanding of treatment plan and will schedule accordingly when ready.\\n\\ntx is cosmetic

## 2023-10-02 NOTE — PROCEDURE: PRESCRIPTION MEDICATION MANAGEMENT
Plan: Patient wanting to hold off to start in cooler months. Due to being outside for expended amount of time. \\nAdvised patient ok to use Tri-Hillary on arms as directed. \\n\\nRecommended patient to schedule with MASSIMO Rios for the following cosmetic procedures under her supervising physician, Madie Marie M.D. \\n-Micro needling  \\n-Chemical peels
Render In Strict Bullet Format?: No
Detail Level: Zone
Continue Regimen: Tri-Hillary 0.01 %-4 %-0.05 % topical cream QHS\\nQuantity: 30.0 g  Days Supply: 30\\nSig: Apply to discoloration on face nightly for up to 4 months after cleansing, then stop for 2 month. Repeat regimen as needed. Use strict sun protection.

## 2023-10-11 ENCOUNTER — APPOINTMENT (RX ONLY)
Dept: URBAN - METROPOLITAN AREA CLINIC 29 | Facility: CLINIC | Age: 58
Setting detail: DERMATOLOGY
End: 2023-10-11

## 2023-10-11 DIAGNOSIS — Z41.9 ENCOUNTER FOR PROCEDURE FOR PURPOSES OTHER THAN REMEDYING HEALTH STATE, UNSPECIFIED: ICD-10-CM

## 2023-10-11 PROCEDURE — ? CHEMICAL PEEL

## 2023-10-11 PROCEDURE — ? MICRONEEDLING

## 2023-10-11 PROCEDURE — ? COSMETIC CONSULTATION - MICRO-NEEDLING

## 2023-10-11 PROCEDURE — ? COSMETIC CONSULTATION: CHEMICAL PEELS

## 2023-10-11 ASSESSMENT — LOCATION DETAILED DESCRIPTION DERM
LOCATION DETAILED: RIGHT SUPERIOR MEDIAL BUCCAL CHEEK
LOCATION DETAILED: LEFT INFERIOR CENTRAL MALAR CHEEK
LOCATION DETAILED: RIGHT CENTRAL BUCCAL CHEEK
LOCATION DETAILED: LEFT SUPERIOR CENTRAL BUCCAL CHEEK

## 2023-10-11 ASSESSMENT — LOCATION SIMPLE DESCRIPTION DERM
LOCATION SIMPLE: LEFT CHEEK
LOCATION SIMPLE: RIGHT CHEEK

## 2023-10-11 ASSESSMENT — LOCATION ZONE DERM: LOCATION ZONE: FACE

## 2023-10-11 NOTE — PROCEDURE: MICRONEEDLING
Price (Use Numbers Only, No Special Characters Or $): 394
Depth In Mm (Location #4): 0.25
Location #3: eyes
Depth In Mm (Location #7): 0.1
Post-Care Instructions: After the procedure, take precautions agains sun exposure. Do not apply sunscreen for 12 hours after the procedure. Do not apply make-up for 12 hours after the procedure. Avoid alcohol based toners for 10-14 days. After 2-3 days patients can return to their regular skin regimen.
Treatment Number (Optional): 1
Consent: Written consent obtained, risks reviewed including but not limited to pain, scarring, infection and incomplete improvement.  Patient understands the procedure is cosmetic in nature and will require out of pocket payment.
Infusions (Optional): hyaluronic acid
Location #1: cheeks, chin, neck and chest
Topical Anesthesia?: BLT cream (benzocaine 10%, lidocaine 4%, tetracaine 2%)
Location #2: forehead, nose, upper lip
Detail Level: Zone
Depth In Mm (Location #2): 0.75

## 2023-10-11 NOTE — PROCEDURE: CHEMICAL PEEL
Price (Use Numbers Only, No Special Characters Or $): 79
Prep: The treated area was degreased with pre-peel cleanser, and vaseline was applied for protection of mucous membranes.
Treatment Number: 1
Detail Level: Zone
Number Of Layers: 3
Chemical Peel: Booster - Retinoic Acid 0.03%
Post-Care Instructions: I reviewed with the patient in detail post-care instructions. Patient should avoid sun exposure and wear sun protection.
Comments: applied RA Vit A peptide peel post microneedling. wash off in 2-3 hours, follow microneedling post procedure protocol. written instructions given. recommended SB peel pads. patient purchased. use tomorrow evening.
Post Peel Care: After the procedure, a post-peel cream was applied to the treated areas. Sun protection and post-care instructions were reviewed with the patient.
Consent: Prior to the procedure, written consent was obtained and risks were reviewed, including but not limited to: redness, peeling, blistering, pigmentary change, scarring, infection, and pain.

## 2024-02-29 ENCOUNTER — APPOINTMENT (RX ONLY)
Dept: URBAN - METROPOLITAN AREA CLINIC 29 | Facility: CLINIC | Age: 59
Setting detail: DERMATOLOGY
End: 2024-02-29

## 2024-02-29 DIAGNOSIS — Z41.9 ENCOUNTER FOR PROCEDURE FOR PURPOSES OTHER THAN REMEDYING HEALTH STATE, UNSPECIFIED: ICD-10-CM

## 2024-02-29 PROCEDURE — ? MICRONEEDLING

## 2024-02-29 PROCEDURE — ? OTHER

## 2024-02-29 ASSESSMENT — LOCATION SIMPLE DESCRIPTION DERM
LOCATION SIMPLE: RIGHT CHEEK
LOCATION SIMPLE: LEFT CHEEK

## 2024-02-29 ASSESSMENT — LOCATION DETAILED DESCRIPTION DERM
LOCATION DETAILED: RIGHT SUPERIOR MEDIAL BUCCAL CHEEK
LOCATION DETAILED: LEFT INFERIOR CENTRAL MALAR CHEEK

## 2024-02-29 ASSESSMENT — LOCATION ZONE DERM: LOCATION ZONE: FACE

## 2024-02-29 NOTE — PROCEDURE: OTHER
Other (Free Text): applied PCA retinol 6% post microneedling
Note Text (......Xxx Chief Complaint.): This diagnosis correlates with the
Detail Level: Zone
Render Risk Assessment In Note?: no

## 2024-02-29 NOTE — PROCEDURE: MICRONEEDLING
Depth In Mm (Location #2): 1
Location #3: forehead, nose, upper lip
Treatment Number (Optional): 2
Depth In Mm (Location #4): 0.25
Detail Level: Zone
Depth In Mm (Location #6): 0.1
Consent: Written consent obtained, risks reviewed including but not limited to pain, scarring, infection and incomplete improvement.  Patient understands the procedure is cosmetic in nature and will require out of pocket payment.
Depth In Mm (Location #1): 1.25
Post-Care Instructions: After the procedure, take precautions agains sun exposure. Do not apply sunscreen for 12 hours after the procedure. Do not apply make-up for 12 hours after the procedure. Avoid alcohol based toners for 10-14 days. After 2-3 days patients can return to their regular skin regimen.
How Many Cc Of Bacteriostatic 0.9% Saline Were Added?: 0
Infusions (Optional): hyaluronic acid
Location #2: chin
Depth In Mm (Location #3): 0.5
Location #4: eyes
Location #1: cheeks, neck, chest
Topical Anesthesia?: BLT cream (benzocaine 10%, lidocaine 4%, tetracaine 2%)

## 2024-09-18 ENCOUNTER — APPOINTMENT (RX ONLY)
Dept: URBAN - METROPOLITAN AREA CLINIC 29 | Facility: CLINIC | Age: 59
Setting detail: DERMATOLOGY
End: 2024-09-18

## 2024-09-18 DIAGNOSIS — L98.8 OTHER SPECIFIED DISORDERS OF THE SKIN AND SUBCUTANEOUS TISSUE: ICD-10-CM

## 2024-09-18 PROCEDURE — ? ADDITIONAL NOTES

## 2024-09-18 PROCEDURE — ? BOTOX (U OR CC)

## 2024-09-18 PROCEDURE — ? COUNSELING

## 2024-09-18 PROCEDURE — ? PHOTO-DOCUMENTATION

## 2024-09-18 ASSESSMENT — LOCATION DETAILED DESCRIPTION DERM
LOCATION DETAILED: RIGHT SUPERIOR FOREHEAD
LOCATION DETAILED: RIGHT LATERAL FOREHEAD
LOCATION DETAILED: LEFT SUPERIOR MEDIAL FOREHEAD
LOCATION DETAILED: LEFT FOREHEAD
LOCATION DETAILED: LEFT INFERIOR LATERAL FOREHEAD
LOCATION DETAILED: GLABELLA

## 2024-09-18 ASSESSMENT — LOCATION SIMPLE DESCRIPTION DERM
LOCATION SIMPLE: RIGHT FOREHEAD
LOCATION SIMPLE: LEFT FOREHEAD
LOCATION SIMPLE: GLABELLA

## 2024-09-18 ASSESSMENT — LOCATION ZONE DERM: LOCATION ZONE: FACE

## 2024-09-18 NOTE — PROCEDURE: BOTOX (U OR CC)
Additional Area 2 Units: 0
Including Pricing Information In The Note: No
Glabellar Complex Units: 20
Reconstitution Date (Optional): 9/12/2024
Post-Care Instructions: Patient instructed to not lie down for 4 hours and limit physical activity for 24 hours.
Document As Units Or Cc?: units
Price (Use Numbers Only, No Special Characters Or $): 485
Forehead Units/Cc: 10
Dilution (U/0.1 Cc): 5
Detail Level: Detailed
Lot #: [id number]
Expiration Date (Month Year): 9/39/2024
Consent: Written consent obtained. Risks include but not limited to lid/brow ptosis, bruising, swelling, diplopia, temporary effect, incomplete chemical denervation.
Quantity Per Injection Site (Units Or Cc): 4 U
Quantity Per Injection Site (Units Or Cc): 2 U

## 2025-01-27 ENCOUNTER — APPOINTMENT (OUTPATIENT)
Age: 60
Setting detail: DERMATOLOGY
End: 2025-01-27

## 2025-01-27 DIAGNOSIS — Z41.9 ENCOUNTER FOR PROCEDURE FOR PURPOSES OTHER THAN REMEDYING HEALTH STATE, UNSPECIFIED: ICD-10-CM

## 2025-01-27 PROCEDURE — ? COSMETIC CONSULTATION: CHEMICAL PEELS

## 2025-01-27 PROCEDURE — ? CHEMICAL PEEL

## 2025-01-27 NOTE — PROCEDURE: CHEMICAL PEEL
Chemical Peel: Vi
Prep: The treated area was degreased with pre-peel cleanser, and vaseline was applied for protection of mucous membranes.
Post Peel Care: After the procedure, a post-peel cream was applied to the treated areas. Sun protection and post-care instructions were reviewed with the patient.
Post-Care Instructions: I reviewed with the patient in detail post-care instructions. Patient should avoid sun exposure and wear sun protection.
Treatment Number: 1
Detail Level: Zone
Consent: Prior to the procedure, written consent was obtained and risks were reviewed, including but not limited to: redness, peeling, blistering, pigmentary change, scarring, infection, and pain.
Comments: January special Buy 1 get 2nd one half off. %637.50 for 2... \\ncleanse, face,neck and chest, acetone, 3 layers, wash off in 4 hours. sent home with post care kit. went over the post care verbally
